# Patient Record
Sex: FEMALE | Race: WHITE
[De-identification: names, ages, dates, MRNs, and addresses within clinical notes are randomized per-mention and may not be internally consistent; named-entity substitution may affect disease eponyms.]

---

## 2017-01-09 ENCOUNTER — HOSPITAL ENCOUNTER (OUTPATIENT)
Dept: HOSPITAL 19 - MC.RAD | Age: 72
End: 2017-01-09
Attending: OBSTETRICS & GYNECOLOGY
Payer: MEDICARE

## 2017-01-09 DIAGNOSIS — Z85.3: ICD-10-CM

## 2017-01-09 DIAGNOSIS — Z90.11: ICD-10-CM

## 2017-01-09 DIAGNOSIS — Z12.31: Primary | ICD-10-CM

## 2017-02-20 ENCOUNTER — HOSPITAL ENCOUNTER (OUTPATIENT)
Dept: HOSPITAL 19 - SDCO | Age: 72
Discharge: HOME | End: 2017-02-20
Attending: INTERNAL MEDICINE
Payer: MEDICARE

## 2017-02-20 VITALS — WEIGHT: 158.51 LBS | HEIGHT: 65 IN | BODY MASS INDEX: 26.41 KG/M2

## 2017-02-20 VITALS — TEMPERATURE: 98.1 F | DIASTOLIC BLOOD PRESSURE: 71 MMHG | HEART RATE: 73 BPM | SYSTOLIC BLOOD PRESSURE: 125 MMHG

## 2017-02-20 VITALS — SYSTOLIC BLOOD PRESSURE: 136 MMHG | DIASTOLIC BLOOD PRESSURE: 78 MMHG | HEART RATE: 67 BPM

## 2017-02-20 VITALS — HEART RATE: 71 BPM | DIASTOLIC BLOOD PRESSURE: 81 MMHG | SYSTOLIC BLOOD PRESSURE: 146 MMHG

## 2017-02-20 VITALS — HEART RATE: 78 BPM | SYSTOLIC BLOOD PRESSURE: 123 MMHG | DIASTOLIC BLOOD PRESSURE: 80 MMHG | TEMPERATURE: 98.1 F

## 2017-02-20 VITALS — SYSTOLIC BLOOD PRESSURE: 126 MMHG | HEART RATE: 72 BPM | DIASTOLIC BLOOD PRESSURE: 62 MMHG

## 2017-02-20 VITALS — HEART RATE: 60 BPM | SYSTOLIC BLOOD PRESSURE: 121 MMHG | DIASTOLIC BLOOD PRESSURE: 85 MMHG

## 2017-02-20 DIAGNOSIS — R19.7: ICD-10-CM

## 2017-02-20 DIAGNOSIS — E03.9: ICD-10-CM

## 2017-02-20 DIAGNOSIS — K63.5: ICD-10-CM

## 2017-02-20 DIAGNOSIS — Z86.010: ICD-10-CM

## 2017-02-20 DIAGNOSIS — E11.9: ICD-10-CM

## 2017-02-20 DIAGNOSIS — K58.9: ICD-10-CM

## 2017-02-20 DIAGNOSIS — D12.0: Primary | ICD-10-CM

## 2017-02-20 DIAGNOSIS — D12.4: ICD-10-CM

## 2017-02-20 DIAGNOSIS — K64.0: ICD-10-CM

## 2017-11-15 ENCOUNTER — HOSPITAL ENCOUNTER (OUTPATIENT)
Dept: HOSPITAL 19 - COL.RAD | Age: 72
End: 2017-11-15
Attending: UROLOGY
Payer: MEDICARE

## 2017-11-15 DIAGNOSIS — N28.1: Primary | ICD-10-CM

## 2018-01-11 ENCOUNTER — HOSPITAL ENCOUNTER (OUTPATIENT)
Dept: HOSPITAL 19 - MC.RAD | Age: 73
End: 2018-01-11
Attending: OBSTETRICS & GYNECOLOGY
Payer: MEDICARE

## 2018-01-11 DIAGNOSIS — Z12.31: Primary | ICD-10-CM

## 2018-08-07 ENCOUNTER — HOSPITAL ENCOUNTER (OUTPATIENT)
Dept: HOSPITAL 19 - COL.RAD | Age: 73
End: 2018-08-07
Attending: FAMILY MEDICINE
Payer: MEDICARE

## 2018-08-07 DIAGNOSIS — T58.91XA: Primary | ICD-10-CM

## 2018-08-07 LAB — INHALED O2 CONCENTRATION: 21 %

## 2018-12-12 ENCOUNTER — HOSPITAL ENCOUNTER (OUTPATIENT)
Dept: HOSPITAL 19 - COL.RAD | Age: 73
End: 2018-12-12
Payer: MEDICARE

## 2018-12-12 DIAGNOSIS — R39.11: ICD-10-CM

## 2018-12-12 DIAGNOSIS — N28.1: Primary | ICD-10-CM

## 2019-01-14 ENCOUNTER — HOSPITAL ENCOUNTER (OUTPATIENT)
Dept: HOSPITAL 19 - MC.RAD | Age: 74
End: 2019-01-14
Attending: OBSTETRICS & GYNECOLOGY
Payer: MEDICARE

## 2019-01-14 DIAGNOSIS — Z12.31: Primary | ICD-10-CM

## 2019-01-14 PROCEDURE — G0279 TOMOSYNTHESIS, MAMMO: HCPCS

## 2019-01-29 ENCOUNTER — HOSPITAL ENCOUNTER (OUTPATIENT)
Dept: HOSPITAL 19 - MHCPAIN | Age: 74
End: 2019-01-29
Attending: ANESTHESIOLOGY
Payer: MEDICARE

## 2019-01-29 ENCOUNTER — HOSPITAL ENCOUNTER (OUTPATIENT)
Dept: HOSPITAL 19 - COL.LAB | Age: 74
End: 2019-01-29
Payer: MEDICARE

## 2019-01-29 DIAGNOSIS — M53.3: ICD-10-CM

## 2019-01-29 DIAGNOSIS — T84.84XA: Primary | ICD-10-CM

## 2019-01-29 DIAGNOSIS — G89.29: Primary | ICD-10-CM

## 2019-01-29 DIAGNOSIS — M47.817: ICD-10-CM

## 2019-01-29 DIAGNOSIS — Z96.651: ICD-10-CM

## 2019-01-29 DIAGNOSIS — M54.16: ICD-10-CM

## 2019-01-29 LAB
BASOPHILS # BLD: 0 10*3/UL (ref 0–0.2)
BASOPHILS NFR BLD AUTO: 0.5 % (ref 0–2)
EOSINOPHIL # BLD: 0.3 10*3/UL (ref 0–0.7)
EOSINOPHIL NFR BLD: 5 % (ref 0–4)
ERYTHROCYTE [DISTWIDTH] IN BLOOD BY AUTOMATED COUNT: 12.4 % (ref 11.5–14.5)
ERYTHROCYTE [SEDIMENTATION RATE] IN BLOOD: 1 MM/HR (ref 0–30)
GRANULOCYTES # BLD AUTO: 65.4 % (ref 42.2–75.2)
HCT VFR BLD AUTO: 43.6 % (ref 37–47)
HGB BLD-MCNC: 14.1 G/DL (ref 12.5–16)
LYMPHOCYTES # BLD: 1.4 10*3/UL (ref 1.2–3.4)
LYMPHOCYTES NFR BLD: 21.1 % (ref 20–51)
MCH RBC QN AUTO: 31 PG (ref 27–31)
MCHC RBC AUTO-ENTMCNC: 32 G/DL (ref 33–37)
MCV RBC AUTO: 95 FL (ref 80–100)
MONOCYTES # BLD: 0.5 10*3/UL (ref 0.1–0.6)
MONOCYTES NFR BLD AUTO: 7.8 % (ref 1.7–9.3)
NEUTROPHILS # BLD: 4.3 10*3/UL (ref 1.4–6.5)
PLATELET # BLD AUTO: 331 K/MM3 (ref 130–400)
PMV BLD AUTO: 9.8 FL (ref 7.4–10.4)
RBC # BLD AUTO: 4.58 M/MM3 (ref 4.1–5.3)

## 2019-01-29 PROCEDURE — G0463 HOSPITAL OUTPT CLINIC VISIT: HCPCS

## 2019-02-05 ENCOUNTER — HOSPITAL ENCOUNTER (OUTPATIENT)
Dept: HOSPITAL 19 - COL.RAD | Age: 74
End: 2019-02-05
Payer: MEDICARE

## 2019-02-05 DIAGNOSIS — T84.84XA: Primary | ICD-10-CM

## 2019-02-05 DIAGNOSIS — Z96.651: ICD-10-CM

## 2019-02-05 PROCEDURE — A9503 TC99M MEDRONATE: HCPCS

## 2019-03-26 ENCOUNTER — HOSPITAL ENCOUNTER (OUTPATIENT)
Dept: HOSPITAL 19 - MHCPAIN | Age: 74
End: 2019-03-26
Attending: ANESTHESIOLOGY
Payer: MEDICARE

## 2019-03-26 DIAGNOSIS — M53.3: ICD-10-CM

## 2019-03-26 DIAGNOSIS — M54.16: ICD-10-CM

## 2019-03-26 DIAGNOSIS — M47.817: ICD-10-CM

## 2019-03-26 DIAGNOSIS — G89.29: Primary | ICD-10-CM

## 2019-03-26 PROCEDURE — G0463 HOSPITAL OUTPT CLINIC VISIT: HCPCS

## 2019-04-04 ENCOUNTER — HOSPITAL ENCOUNTER (OUTPATIENT)
Dept: HOSPITAL 19 - MHCPAIN | Age: 74
End: 2019-04-04
Attending: ANESTHESIOLOGY
Payer: MEDICARE

## 2019-04-04 DIAGNOSIS — M54.16: ICD-10-CM

## 2019-04-04 DIAGNOSIS — M47.817: Primary | ICD-10-CM

## 2019-04-16 ENCOUNTER — HOSPITAL ENCOUNTER (OUTPATIENT)
Dept: HOSPITAL 19 - MHCPAIN | Age: 74
End: 2019-04-16
Attending: ANESTHESIOLOGY
Payer: MEDICARE

## 2019-04-16 DIAGNOSIS — G89.29: Primary | ICD-10-CM

## 2019-04-16 DIAGNOSIS — M47.817: ICD-10-CM

## 2019-04-16 DIAGNOSIS — M53.3: ICD-10-CM

## 2019-04-16 DIAGNOSIS — M54.16: ICD-10-CM

## 2019-04-16 PROCEDURE — G0463 HOSPITAL OUTPT CLINIC VISIT: HCPCS

## 2019-04-22 ENCOUNTER — HOSPITAL ENCOUNTER (OUTPATIENT)
Dept: HOSPITAL 19 - MHCPAIN | Age: 74
End: 2019-04-22
Attending: ANESTHESIOLOGY
Payer: MEDICARE

## 2019-04-22 DIAGNOSIS — M47.817: Primary | ICD-10-CM

## 2019-04-22 DIAGNOSIS — M54.16: ICD-10-CM

## 2019-06-19 ENCOUNTER — HOSPITAL ENCOUNTER (OUTPATIENT)
Dept: HOSPITAL 19 - MHCPAIN | Age: 74
End: 2019-06-19
Attending: ANESTHESIOLOGY
Payer: MEDICARE

## 2019-06-19 DIAGNOSIS — M54.16: ICD-10-CM

## 2019-06-19 DIAGNOSIS — M53.3: ICD-10-CM

## 2019-06-19 DIAGNOSIS — G89.29: Primary | ICD-10-CM

## 2019-06-19 DIAGNOSIS — M47.817: ICD-10-CM

## 2019-06-19 PROCEDURE — G0463 HOSPITAL OUTPT CLINIC VISIT: HCPCS

## 2019-07-29 ENCOUNTER — HOSPITAL ENCOUNTER (OUTPATIENT)
Dept: HOSPITAL 19 - MHCPAIN | Age: 74
End: 2019-07-29
Attending: ANESTHESIOLOGY
Payer: MEDICARE

## 2019-07-29 DIAGNOSIS — M54.16: ICD-10-CM

## 2019-07-29 DIAGNOSIS — M47.817: ICD-10-CM

## 2019-07-29 DIAGNOSIS — G89.29: Primary | ICD-10-CM

## 2019-07-29 DIAGNOSIS — M53.3: ICD-10-CM

## 2019-07-29 PROCEDURE — G0463 HOSPITAL OUTPT CLINIC VISIT: HCPCS

## 2020-02-06 ENCOUNTER — HOSPITAL ENCOUNTER (OUTPATIENT)
Dept: HOSPITAL 19 - MC.RAD | Age: 75
End: 2020-02-06
Attending: OBSTETRICS & GYNECOLOGY
Payer: MEDICARE

## 2020-02-06 DIAGNOSIS — Z12.31: Primary | ICD-10-CM

## 2020-02-06 DIAGNOSIS — Z90.11: ICD-10-CM

## 2020-03-03 ENCOUNTER — HOSPITAL ENCOUNTER (OUTPATIENT)
Dept: HOSPITAL 19 - SDCO | Age: 75
Discharge: HOME | End: 2020-03-03
Attending: INTERNAL MEDICINE
Payer: MEDICARE

## 2020-03-03 VITALS — SYSTOLIC BLOOD PRESSURE: 131 MMHG | HEART RATE: 95 BPM | DIASTOLIC BLOOD PRESSURE: 107 MMHG

## 2020-03-03 VITALS — SYSTOLIC BLOOD PRESSURE: 133 MMHG | HEART RATE: 79 BPM | DIASTOLIC BLOOD PRESSURE: 71 MMHG

## 2020-03-03 VITALS — SYSTOLIC BLOOD PRESSURE: 138 MMHG | TEMPERATURE: 99.1 F | DIASTOLIC BLOOD PRESSURE: 78 MMHG | HEART RATE: 92 BPM

## 2020-03-03 VITALS — BODY MASS INDEX: 27.55 KG/M2 | HEIGHT: 65 IN | WEIGHT: 165.35 LBS

## 2020-03-03 VITALS — DIASTOLIC BLOOD PRESSURE: 107 MMHG | HEART RATE: 95 BPM | SYSTOLIC BLOOD PRESSURE: 131 MMHG

## 2020-03-03 DIAGNOSIS — M43.00: ICD-10-CM

## 2020-03-03 DIAGNOSIS — F41.9: ICD-10-CM

## 2020-03-03 DIAGNOSIS — Z88.7: ICD-10-CM

## 2020-03-03 DIAGNOSIS — Z88.8: ICD-10-CM

## 2020-03-03 DIAGNOSIS — K58.9: ICD-10-CM

## 2020-03-03 DIAGNOSIS — Z12.11: Primary | ICD-10-CM

## 2020-03-03 DIAGNOSIS — Z88.0: ICD-10-CM

## 2020-03-03 DIAGNOSIS — D12.0: ICD-10-CM

## 2020-03-03 DIAGNOSIS — E11.9: ICD-10-CM

## 2020-03-03 DIAGNOSIS — K64.0: ICD-10-CM

## 2020-03-03 DIAGNOSIS — E03.9: ICD-10-CM

## 2020-03-03 DIAGNOSIS — D12.3: ICD-10-CM

## 2020-03-03 DIAGNOSIS — D12.2: ICD-10-CM

## 2020-03-03 DIAGNOSIS — D12.5: ICD-10-CM

## 2020-03-03 DIAGNOSIS — F41.0: ICD-10-CM

## 2020-03-03 DIAGNOSIS — Z79.899: ICD-10-CM

## 2020-03-03 DIAGNOSIS — Z85.3: ICD-10-CM

## 2020-03-03 DIAGNOSIS — Z86.010: ICD-10-CM

## 2020-03-03 NOTE — NUR
IV was discontinued and Dr. Flores here to talk with the patient.  All
questions answered.  Patient dresses self.

## 2020-03-03 NOTE — NUR
Patient returns to bay 2 per cart and is awake and alert.  Transfers from cart
to recliner with one person assist.  IV fluids continue to infuse and site is
free of redness.  Denies pain or nausea and is sipping on diet Pepsi and
eating muffin.

## 2020-03-03 NOTE — NUR
Patient dismissed to home per private vehicle driven spouse and taken to the
front door per wheelchair and assisted into vehicle by this RN with dismissal
instructions in hand.

## 2021-02-09 ENCOUNTER — HOSPITAL ENCOUNTER (OUTPATIENT)
Dept: HOSPITAL 19 - MC.RAD | Age: 76
End: 2021-02-09
Attending: OBSTETRICS & GYNECOLOGY
Payer: MEDICARE

## 2021-02-09 DIAGNOSIS — Z12.31: Primary | ICD-10-CM

## 2021-06-29 ENCOUNTER — HOSPITAL ENCOUNTER (OUTPATIENT)
Dept: HOSPITAL 19 - MHCPAIN | Age: 76
End: 2021-06-29
Attending: ANESTHESIOLOGY
Payer: MEDICARE

## 2021-06-29 DIAGNOSIS — M54.5: ICD-10-CM

## 2021-06-29 DIAGNOSIS — M53.3: ICD-10-CM

## 2021-06-29 DIAGNOSIS — M47.816: Primary | ICD-10-CM

## 2021-06-29 PROCEDURE — G0463 HOSPITAL OUTPT CLINIC VISIT: HCPCS

## 2022-02-07 NOTE — NUR
Dismissal instructions given and signed.  Patient voices understanding of
these. 2 = A lot of assistance

## 2022-03-09 ENCOUNTER — HOSPITAL ENCOUNTER (OUTPATIENT)
Dept: HOSPITAL 19 - MC.RAD | Age: 77
End: 2022-03-09
Attending: FAMILY MEDICINE
Payer: MEDICARE

## 2022-03-09 DIAGNOSIS — Z12.31: Primary | ICD-10-CM

## 2022-03-23 ENCOUNTER — HOSPITAL ENCOUNTER (OUTPATIENT)
Dept: HOSPITAL 19 - MHCPAIN | Age: 77
End: 2022-03-23
Attending: ANESTHESIOLOGY
Payer: MEDICARE

## 2022-03-23 DIAGNOSIS — M47.817: Primary | ICD-10-CM

## 2022-03-23 DIAGNOSIS — M54.50: ICD-10-CM

## 2022-03-23 DIAGNOSIS — G89.29: ICD-10-CM

## 2022-03-23 DIAGNOSIS — M53.3: ICD-10-CM

## 2022-03-23 PROCEDURE — G0463 HOSPITAL OUTPT CLINIC VISIT: HCPCS

## 2022-03-28 ENCOUNTER — HOSPITAL ENCOUNTER (OUTPATIENT)
Dept: HOSPITAL 19 - MHCPAIN | Age: 77
End: 2022-03-28
Attending: ANESTHESIOLOGY
Payer: MEDICARE

## 2022-03-28 DIAGNOSIS — M53.3: ICD-10-CM

## 2022-03-28 DIAGNOSIS — M47.817: Primary | ICD-10-CM

## 2022-03-28 DIAGNOSIS — M54.50: ICD-10-CM

## 2022-04-06 ENCOUNTER — HOSPITAL ENCOUNTER (OUTPATIENT)
Dept: HOSPITAL 19 - MHCPAIN | Age: 77
End: 2022-04-06
Attending: ANESTHESIOLOGY
Payer: MEDICARE

## 2022-04-06 DIAGNOSIS — M47.817: Primary | ICD-10-CM

## 2022-04-06 DIAGNOSIS — G89.29: ICD-10-CM

## 2022-04-06 DIAGNOSIS — M53.3: ICD-10-CM

## 2022-04-06 PROCEDURE — G0463 HOSPITAL OUTPT CLINIC VISIT: HCPCS

## 2022-05-18 ENCOUNTER — HOSPITAL ENCOUNTER (OUTPATIENT)
Dept: HOSPITAL 19 - WSPT | Age: 77
LOS: 13 days | Discharge: HOME | End: 2022-05-31
Attending: ANESTHESIOLOGY
Payer: MEDICARE

## 2022-05-18 DIAGNOSIS — M47.896: Primary | ICD-10-CM

## 2022-06-30 ENCOUNTER — HOSPITAL ENCOUNTER (OUTPATIENT)
Dept: HOSPITAL 19 - WSPT | Age: 77
Discharge: STILL ADMITTED OR ADMINISTRATIVE | End: 2022-06-30
Attending: ANESTHESIOLOGY
Payer: MEDICARE

## 2022-06-30 DIAGNOSIS — M47.896: Primary | ICD-10-CM

## 2022-07-19 ENCOUNTER — HOSPITAL ENCOUNTER (OUTPATIENT)
Dept: HOSPITAL 19 - WSPT | Age: 77
LOS: 12 days | Discharge: HOME | End: 2022-07-31
Attending: ANESTHESIOLOGY
Payer: MEDICARE

## 2022-07-19 DIAGNOSIS — M47.896: Primary | ICD-10-CM

## 2022-08-29 ENCOUNTER — HOSPITAL ENCOUNTER (OUTPATIENT)
Dept: HOSPITAL 19 - WSPT | Age: 77
LOS: 2 days | Discharge: HOME | End: 2022-08-31
Attending: ANESTHESIOLOGY
Payer: MEDICARE

## 2022-08-29 DIAGNOSIS — M47.896: Primary | ICD-10-CM

## 2022-09-01 ENCOUNTER — HOSPITAL ENCOUNTER (OUTPATIENT)
Dept: HOSPITAL 19 - WSPT | Age: 77
Discharge: HOME | End: 2022-09-01
Attending: ANESTHESIOLOGY
Payer: MEDICARE

## 2022-09-01 DIAGNOSIS — M47.896: Primary | ICD-10-CM

## 2023-03-07 ENCOUNTER — HOSPITAL ENCOUNTER (OUTPATIENT)
Dept: HOSPITAL 19 - SDCO | Age: 78
Discharge: HOME | End: 2023-03-07
Attending: INTERNAL MEDICINE
Payer: MEDICARE

## 2023-03-07 VITALS — TEMPERATURE: 97 F | DIASTOLIC BLOOD PRESSURE: 79 MMHG | HEART RATE: 104 BPM | SYSTOLIC BLOOD PRESSURE: 125 MMHG

## 2023-03-07 VITALS — BODY MASS INDEX: 27.58 KG/M2 | WEIGHT: 165.57 LBS | HEIGHT: 65 IN

## 2023-03-07 VITALS — DIASTOLIC BLOOD PRESSURE: 73 MMHG | TEMPERATURE: 97 F | HEART RATE: 86 BPM | SYSTOLIC BLOOD PRESSURE: 115 MMHG

## 2023-03-07 VITALS — HEART RATE: 75 BPM | DIASTOLIC BLOOD PRESSURE: 84 MMHG | SYSTOLIC BLOOD PRESSURE: 127 MMHG

## 2023-03-07 VITALS — HEART RATE: 79 BPM | SYSTOLIC BLOOD PRESSURE: 130 MMHG | DIASTOLIC BLOOD PRESSURE: 65 MMHG

## 2023-03-07 DIAGNOSIS — Z12.11: Primary | ICD-10-CM

## 2023-03-07 DIAGNOSIS — K57.30: ICD-10-CM

## 2023-03-07 DIAGNOSIS — K62.4: ICD-10-CM

## 2023-03-07 DIAGNOSIS — D12.4: ICD-10-CM

## 2023-03-07 DIAGNOSIS — D12.5: ICD-10-CM

## 2023-03-07 NOTE — NUR
0903 PATIENT RETURNS TO ROOM 1 VIA CART. PATIENT IS ALERT AND ORIENTED.
PATIENT AMBULATES TO RECLINER WITH THE ASSITANCE OF 2 NURSES. RESPIRATIONS
EVEN AND UNLABORED. VITAL SIGNS OBTAINED. PATIENT REQUESTED A MUFFIN, WATER
AND COFFEE. NO DIFFICULTIES SWALLOWING.
 
0925 DOCTOR IN TO SPEAK WITH PATIENT.
 
0940 DISCHARGE INSTRUCTIONS REVIEWED WITH PATIENT. PATIENT VERBALIZED
UNDERSTANDING.
 
0935 DISCONTINUED IV FORM LEFT AC WITH NO DIFFICULTIES.
 
0947 PATIENT DISCHARGES FROM UNIT VIA WHEELCHAIR IN STABLE CONDITION TO
PERSONAL VEHICLE.

## 2023-03-20 ENCOUNTER — HOSPITAL ENCOUNTER (OUTPATIENT)
Dept: HOSPITAL 19 - WSPT | Age: 78
LOS: 11 days | Discharge: HOME | End: 2023-03-31
Attending: FAMILY MEDICINE
Payer: MEDICARE

## 2023-03-20 DIAGNOSIS — M54.50: Primary | ICD-10-CM

## 2023-04-20 ENCOUNTER — HOSPITAL ENCOUNTER (OUTPATIENT)
Dept: HOSPITAL 19 - MC.RAD | Age: 78
End: 2023-04-20
Attending: FAMILY MEDICINE
Payer: MEDICARE

## 2023-04-20 DIAGNOSIS — Z90.11: ICD-10-CM

## 2023-04-20 DIAGNOSIS — Z85.3: ICD-10-CM

## 2023-04-20 DIAGNOSIS — Z12.31: Primary | ICD-10-CM
